# Patient Record
Sex: FEMALE | Race: ASIAN | NOT HISPANIC OR LATINO | Employment: FULL TIME | URBAN - METROPOLITAN AREA
[De-identification: names, ages, dates, MRNs, and addresses within clinical notes are randomized per-mention and may not be internally consistent; named-entity substitution may affect disease eponyms.]

---

## 2021-05-30 ENCOUNTER — RECORDS - HEALTHEAST (OUTPATIENT)
Dept: ADMINISTRATIVE | Facility: CLINIC | Age: 46
End: 2021-05-30

## 2021-05-31 ENCOUNTER — RECORDS - HEALTHEAST (OUTPATIENT)
Dept: ADMINISTRATIVE | Facility: CLINIC | Age: 46
End: 2021-05-31

## 2023-07-19 ENCOUNTER — APPOINTMENT (OUTPATIENT)
Dept: RADIOLOGY | Facility: HOSPITAL | Age: 48
End: 2023-07-19
Attending: STUDENT IN AN ORGANIZED HEALTH CARE EDUCATION/TRAINING PROGRAM
Payer: COMMERCIAL

## 2023-07-19 ENCOUNTER — HOSPITAL ENCOUNTER (EMERGENCY)
Facility: HOSPITAL | Age: 48
Discharge: HOME | End: 2023-07-19
Admitting: STUDENT IN AN ORGANIZED HEALTH CARE EDUCATION/TRAINING PROGRAM
Payer: COMMERCIAL

## 2023-07-19 VITALS
WEIGHT: 176 LBS | HEART RATE: 76 BPM | OXYGEN SATURATION: 99 % | SYSTOLIC BLOOD PRESSURE: 186 MMHG | RESPIRATION RATE: 14 BRPM | DIASTOLIC BLOOD PRESSURE: 104 MMHG | TEMPERATURE: 98.3 F

## 2023-07-19 DIAGNOSIS — S92.919A CLOSED DISPLACED FRACTURE OF PHALANX OF TOE, UNSPECIFIED LATERALITY, UNSPECIFIED TOE, INITIAL ENCOUNTER: ICD-10-CM

## 2023-07-19 PROCEDURE — 99284 EMERGENCY DEPT VISIT MOD MDM: CPT | Mod: 25

## 2023-07-19 PROCEDURE — 28510 TREATMENT OF TOE FRACTURE: CPT | Mod: LT

## 2023-07-19 PROCEDURE — 73630 X-RAY EXAM OF FOOT: CPT | Mod: LT

## 2023-07-19 ASSESSMENT — ACTIVITIES OF DAILY LIVING (ADL): ADLS_ACUITY_SCORE: 35

## 2023-07-19 NOTE — Clinical Note
Carlos Mena was seen and treated in our emergency department on 7/19/2023.  She may return to work on 07/20/2023.       If you have any questions or concerns, please don't hesitate to call.      Rosa Shepherd, TABBY

## 2023-07-19 NOTE — ED TRIAGE NOTES
Pt reports kicking wall.  Pt has bruising to top of L foot, with some bruising to 4th toe.  Pt reports taking APAP PTA.     Triage Assessment     Row Name 07/19/23 4752       Triage Assessment (Adult)    Airway WDL WDL       Respiratory WDL    Respiratory WDL WDL       Skin Circulation/Temperature WDL    Skin Circulation/Temperature WDL WDL       Cardiac WDL    Cardiac WDL WDL       Peripheral/Neurovascular WDL    Peripheral Neurovascular WDL WDL       Cognitive/Neuro/Behavioral WDL    Cognitive/Neuro/Behavioral WDL WDL

## 2023-07-19 NOTE — ED PROVIDER NOTES
EMERGENCY DEPARTMENT ENCOUNTER      NAME: Carlos Mena  AGE: 47 year old female  YOB: 1975  MRN: [id number]  EVALUATION DATE & TIME: 7/19/2023  6:07 PM    PCP: No primary care provider on file.    ED PROVIDER: Rosa Shepherd PA-C      Chief Complaint   Patient presents with     Foot Injury         FINAL IMPRESSION:  1. Closed displaced fracture of phalanx of toe, unspecified laterality, unspecified toe, initial encounter        ED COURSE & MEDICAL DECISION MAKING:    Pertinent Labs & Imaging studies reviewed. (See chart for details)  47 year old female presents to the Emergency Department for evaluation of foot pain.  Earlier today patient accidentally stubbed her left foot in a doorway.  She did not fall.  She is not on blood thinners.  Vital signs reviewed patient is hypertensive which improved throughout her ED visit.  Afebrile.  On exam, left fourth toe is tender to palpation.  Remainder of left foot is nontender to palpation.  Normal range of motion, sensation and strength of all joints of the left extremity.  No surrounding erythema, edema, ecchymosis.  No lacerations or abrasions.  No warmth.  Pulses are 2+ bilaterally.    X-ray of the foot shows acute minimally displaced oblique fracture of the fourth toe proximal phalanx with soft tissue swelling.  No intra-articular extension. No signs of infection. Compartments are soft.   Patient's toe was ginette taped and placed in a walking boot.  Patient was given crutches to help her walk.  Patient to follow-up with [facility] to discuss her ED visit.  Patient will return to the ED if new symptoms develops symptoms worsen.  Patient agrees to plan.  All questions answered.      ED COURSE:   6:12 PM I met with the patient to gather history and to perform my initial exam. We discussed plans for the ED course, including diagnostic testing and treatment.   8:21 PM I updated the patient on the imaging results. Patient will be discharged home.        At  the conclusion of the encounter I discussed the results of all of the tests and the disposition. The questions were answered. The patient or family acknowledged understanding and was agreeable with the care plan.     0 minutes of critical care time       Additional Documentation    History:    Supplemental history from: Documented in chart, if applicable    External Record(s) reviewed: Documented in chart, if applicable.    Work Up:    Chart documentation includes differential considered and any EKGs or imaging interpreted by provider.    In additional to work up documented, I considered the following work up: Documented in chart, if applicable.    External consultation:    Discussion of management with another provider: Documented in chart, if applicable    Complicating factors:    Care impacted by chronic illness: N/A    Care affected by social determinants of health: N/A    Disposition considerations: Discharge. No recommendations on prescription strength medication(s). See documentation for any additional details.      MEDICATIONS GIVEN IN THE EMERGENCY:  Medications - No data to display    NEW PRESCRIPTIONS STARTED AT TODAY'S ER VISIT  New Prescriptions    No medications on file          =================================================================    HPI    Patient information was obtained from: Patient     Use of : N/A         Carlos Mena is a 47 year old female with no pertinent medical history on file who presents to this ED via walk-in for evaluation of foot pain.     Patient reports that earlier today she tripped and hit her left foot on her doorway, but she endorses being able to catch herself, and she denies any falling. She endorses developing left foot pain and left ring toe pain since, however, she denies any recent ankle pain, knee pain, or leg pain. She endorses taking Excedrin at approximately 2:00 PM for her symptoms. She denies taking any blood thinners. She denies any other  complications at this time.      REVIEW OF SYSTEMS   Review of Systems   Musculoskeletal:        Positive for left foot pain. Positive for left ring toe pain. Negative for ankle pain. Negative for knee pain. Negative for leg pain.   All other systems reviewed and are negative.       PAST MEDICAL HISTORY:  History reviewed. No pertinent past medical history.    PAST SURGICAL HISTORY:  No past surgical history on file.        CURRENT MEDICATIONS:    No current outpatient medications on file.      ALLERGIES:  No Known Allergies    FAMILY HISTORY:  No family history on file.    SOCIAL HISTORY:   Social History     Socioeconomic History     Marital status: Single       VITALS:  BP (!) 186/104   Pulse 76   Temp 98.3  F (36.8  C)   Resp 14   Wt 79.8 kg (176 lb)   LMP 06/25/2023 (Exact Date)   SpO2 99%     PHYSICAL EXAM    Physical Exam  Vitals and nursing note reviewed.   Constitutional:       Appearance: Normal appearance.   HENT:      Head: Atraumatic.      Right Ear: External ear normal.      Left Ear: External ear normal.      Nose: Nose normal.      Mouth/Throat:      Mouth: Mucous membranes are moist.   Eyes:      Conjunctiva/sclera: Conjunctivae normal.      Pupils: Pupils are equal, round, and reactive to light.   Cardiovascular:      Rate and Rhythm: Normal rate and regular rhythm.      Pulses: Normal pulses.      Heart sounds: Normal heart sounds. No murmur heard.     No friction rub. No gallop.   Pulmonary:      Effort: Pulmonary effort is normal.      Breath sounds: Normal breath sounds. No wheezing or rales.   Abdominal:      Tenderness: There is no abdominal tenderness. There is no guarding or rebound.   Musculoskeletal:      Cervical back: Normal range of motion.      Comments: left fourth toe is tender to palpation.  Remainder of left foot is nontender to palpation.  Normal range of motion, sensation and strength of all joints of the left extremity.  No surrounding erythema, edema, ecchymosis.  No  lacerations or abrasions.  No warmth.  Pulses are 2+ bilaterally.     Skin:     General: Skin is dry.   Neurological:      Mental Status: She is alert. Mental status is at baseline.   Psychiatric:         Mood and Affect: Mood normal.         Thought Content: Thought content normal.          LAB:  All pertinent labs reviewed and interpreted.  Labs Ordered and Resulted from Time of ED Arrival to Time of ED Departure - No data to display     RADIOLOGY:  Reviewed all pertinent imaging. Please see official radiology report.  XR Foot Left G/E 3 Views   Final Result   IMPRESSION: Acute, minimally displaced oblique fracture of the fourth toe proximal phalanx with soft tissue swelling. No intra-articular extension.      NOTE: ABNORMAL REPORT      THE DICTATION ABOVE DESCRIBES AN ABNORMALITY FOR WHICH FOLLOW-UP IS NEEDED.           PROCEDURES:   None.      I, Ishaan Montgomery, am serving as a scribe to document services personally performed by Rosa Shepherd PA-C, based on my observation and the provider's statements to me. I, Rosa Shepherd PA-C, attest that Ishaan Montgomery is acting in a scribe capacity, has observed my performance of the services and has documented them in accordance with my direction.    Rosa Shepherd PA-C  [facility] EMERGENCY DEPARTMENT  [facility] [facility] [id number] [phone]       Rosa Shepherd PA-C  07/19/23 1357

## 2023-07-19 NOTE — Clinical Note
Carlos Mena was seen and treated in our emergency department on 7/19/2023.  She may return to work on 07/21/2023.  Patient will need to follow up with orthopedics. Please limit stairs and walking, patient will be using walking boot and crutches, elevate left lower extremity as much as possible.     If you have any questions or concerns, please don't hesitate to call.      Rosa Shepherd, PA-C

## 2023-07-20 NOTE — DISCHARGE INSTRUCTIONS
Rest.  Keep toe ginette taped.  Keep walking boot on.  Use crutches to assist you with walking.  Remain nonweightbearing.  Follow-up with [facility] to discuss your fracture.  Return to the ED if new symptoms develop or symptoms worsen.

## 2023-10-15 ENCOUNTER — HEALTH MAINTENANCE LETTER (OUTPATIENT)
Age: 48
End: 2023-10-15

## 2024-12-08 ENCOUNTER — HEALTH MAINTENANCE LETTER (OUTPATIENT)
Age: 49
End: 2024-12-08

## 2025-08-31 ENCOUNTER — HEALTH MAINTENANCE LETTER (OUTPATIENT)
Age: 50
End: 2025-08-31